# Patient Record
Sex: FEMALE | Race: WHITE | Employment: FULL TIME | URBAN - METROPOLITAN AREA
[De-identification: names, ages, dates, MRNs, and addresses within clinical notes are randomized per-mention and may not be internally consistent; named-entity substitution may affect disease eponyms.]

---

## 2018-07-02 PROBLEM — H69.93 EUSTACHIAN TUBE DYSFUNCTION, BILATERAL: Status: ACTIVE | Noted: 2018-07-02

## 2018-07-02 PROBLEM — H93.293 ABNORMAL AUDITORY PERCEPTION, BILATERAL: Status: ACTIVE | Noted: 2018-07-02

## 2018-07-02 PROBLEM — H69.83 EUSTACHIAN TUBE DYSFUNCTION, BILATERAL: Status: ACTIVE | Noted: 2018-07-02

## 2022-07-29 ENCOUNTER — OFFICE VISIT (OUTPATIENT)
Facility: LOCATION | Age: 52
End: 2022-07-29
Payer: COMMERCIAL

## 2022-07-29 DIAGNOSIS — H93.12 TINNITUS OF LEFT EAR: Primary | ICD-10-CM

## 2022-07-29 DIAGNOSIS — H93.293 ABNORMAL AUDITORY PERCEPTION OF BOTH EARS: Primary | ICD-10-CM

## 2022-07-29 PROCEDURE — 99213 OFFICE O/P EST LOW 20 MIN: CPT | Performed by: OTOLARYNGOLOGY

## 2022-07-29 RX ORDER — FLUOCINOLONE ACETONIDE 0.11 MG/ML
3 OIL AURICULAR (OTIC) 3 TIMES DAILY
Qty: 20 ML | Refills: 0 | Status: SHIPPED | OUTPATIENT
Start: 2022-07-29 | End: 2022-07-29

## 2022-07-29 RX ORDER — FLUOCINOLONE ACETONIDE 0.11 MG/ML
3 OIL AURICULAR (OTIC) 3 TIMES DAILY
Qty: 20 ML | Refills: 0 | Status: SHIPPED | OUTPATIENT
Start: 2022-07-29 | End: 2022-08-05

## 2022-07-29 NOTE — PROGRESS NOTES
Agustina Hansen is a 45year old female. Patient presents with:  Ear Problem: clicking in ear and pressure     HPI:   Follow-up 61-year-old female she came in for wax impaction which I cleaned under the operative microscope had a normal audiogram after that has been complaining of a constant clicking in her left ear since the wax removal wax was done without irrigation. No current outpatient medications on file. No past medical history on file. Social History:  Social History    Tobacco Use      Smoking status: Never Smoker      Smokeless tobacco: Never Used    Alcohol use: Not on file    Drug use: Not on file     No past surgical history on file. REVIEW OF SYSTEMS:   GENERAL HEALTH: feels well otherwise  GENERAL : denies fever, chills, sweats, weight loss, weight gain  SKIN: denies any unusual skin lesions or rashes  RESPIRATORY: denies shortness of breath with exertion  NEURO: denies headaches    EXAM:   There were no vitals taken for this visit. System Findings Details   Constitutional Normal Overall appearance - Normal.   Head/Face Normal Facial features -- Normal. Skull - Normal.   Eyes Normal Pupils equal ,round ,react to light and accomidate   Ears Normal External Ear Right: Normal, Left: Normal. Canal - Right: Normal, Left: Normal. TM - Right: Normal left: Normal   Nose Normal External Nose, Normal, Septum -normal turbinates - Right: Normal left: Normal   Mouth/Throat Normal Lips/teeth/gums - Normal. Tonsils -normal oropharynx - Normal.   Neck Exam Normal Inspection - Normal. Palpation - Normal. Parotid gland - Normal. Thyroid gland - Normal.   Lymph Detail Normal Submental. Submandibular. Anterior cervical. Posterior cervical. Supraclavicular. ASSESSMENT AND PLAN:   1. Tinnitus of left ear  Patient experience clicking sensation left ear I evaluated under the microscope there was a small crust located near to the eardrum. I removed it with some improvement.   We will start her on Derm otic eardrops for 1 week duration left ear only. If continued symptoms she will call for further recommendations      The patient indicates understanding of these issues and agrees to the plan.       Leonora Robles MD  7/29/2022  12:40 PM

## 2022-07-29 NOTE — PROGRESS NOTES
Fabio Schneider was seen for an audiometric evaluation today. Referred back to physician.     Molly Katz

## 2022-09-19 ENCOUNTER — OFFICE VISIT (OUTPATIENT)
Facility: LOCATION | Age: 52
End: 2022-09-19
Payer: COMMERCIAL

## 2022-09-19 DIAGNOSIS — H93.12 TINNITUS, LEFT: Primary | ICD-10-CM

## 2022-09-19 DIAGNOSIS — H93.12 TINNITUS OF LEFT EAR: Primary | ICD-10-CM

## 2022-09-19 PROCEDURE — 92552 PURE TONE AUDIOMETRY AIR: CPT | Performed by: AUDIOLOGIST

## 2022-09-19 PROCEDURE — 99213 OFFICE O/P EST LOW 20 MIN: CPT | Performed by: OTOLARYNGOLOGY

## 2022-09-19 PROCEDURE — 92567 TYMPANOMETRY: CPT | Performed by: AUDIOLOGIST

## 2022-09-19 NOTE — PROGRESS NOTES
Morenita Reynolds is a 45year old female. Patient presents with:  Ear Pain  Ear Problem: Tinnitus     HPI:   Patient came in a while back had wax impaction and complained of a clicking sensation in her left ear. Her wax was cleaned she was given Derm otic drops told to come back if this persisted. Left ear clicking is persisted not associated with otorrhea, otalgia, vertigo, or hearing loss. Her initial audiogram showed a slight asymmetry in the right ear low-frequency. She did not notice this hearing loss. No current outpatient medications on file. History reviewed. No pertinent past medical history. Social History:  Social History    Tobacco Use      Smoking status: Never Smoker      Smokeless tobacco: Never Used    Alcohol use: Not on file    Drug use: Not on file     History reviewed. No pertinent surgical history. REVIEW OF SYSTEMS:   GENERAL HEALTH: feels well otherwise  GENERAL : denies fever, chills, sweats, weight loss, weight gain  SKIN: denies any unusual skin lesions or rashes  RESPIRATORY: denies shortness of breath with exertion  NEURO: denies headaches    EXAM:   There were no vitals taken for this visit. System Pertinent findings Details   Constitutional  Overall appearance - Normal.   Head/Face  Facial features -- Normal. Skull - Normal.   Eyes  Pupils equal ,round ,react to light and accomidate   Ears  External Ear Right: Normal, Left: Normal. Canal - Right: Normal, Left: Normal. TM - Right: Normal left: Normal   Nose  External Nose, Normal, Septum -normal turbinates - Right: Normal left: Normal   Mouth/Throat  Lips/teeth/gums - Normal. Tonsils -normal oropharynx - Normal.   Neck Exam  Inspection - Normal. Palpation - Normal. Parotid gland - Normal. Thyroid gland -normal   Lymph Detail  Submental. Submandibular. Anterior cervical. Posterior cervical. Supraclavicular. Most recent audiogram shows completely normal hearing normal type a tympanogram  ASSESSMENT AND PLAN:   1. Tinnitus, left  Follow-up audiogram 6 to 8 months. No other recommendations at this time. Speculation sometimes clicking can be due to tensor tympani spasm or stapedius muscle spasm. The patient indicates understanding of these issues and agrees to the plan.       Flaco Dominguez MD  9/19/2022  4:35 PM

## 2022-09-19 NOTE — PROGRESS NOTES
Zonia Temple was seen for an audiometric evaluation and tympanogram today. Referred back to physician.     Jonathan Arambula MS, CCC-A

## 2024-09-18 ENCOUNTER — OFFICE VISIT (OUTPATIENT)
Facility: LOCATION | Age: 54
End: 2024-09-18
Payer: COMMERCIAL

## 2024-09-18 DIAGNOSIS — H93.293 ABNORMAL AUDITORY PERCEPTION OF BOTH EARS: Primary | ICD-10-CM

## 2024-09-18 DIAGNOSIS — H93.12 TINNITUS OF LEFT EAR: Primary | ICD-10-CM

## 2024-09-18 PROCEDURE — 92567 TYMPANOMETRY: CPT | Performed by: AUDIOLOGIST

## 2024-09-18 PROCEDURE — 92557 COMPREHENSIVE HEARING TEST: CPT | Performed by: AUDIOLOGIST

## 2024-09-18 PROCEDURE — 99214 OFFICE O/P EST MOD 30 MIN: CPT | Performed by: OTOLARYNGOLOGY

## 2024-09-18 NOTE — PROGRESS NOTES
Tracy Ignacio was seen for audiometric evaluation today.  Referred back to physician.     Molly Ashley

## 2024-09-18 NOTE — PROGRESS NOTES
Tracy Ignacio is a 54 year old female. No chief complaint on file.    HPI:   54-year-old white female with history of clicking sensation in the left ear comes and goes I saw her 2 years ago to the day she had normal hearing thought it might be some sort of muscle spasm of either the stapedius or tensor tympani muscle I suggested just follow-up audiograms.  Apparently this went away for 6 to 8 months but then came back again she is here for follow-up.  No other symptoms related to this  No current outpatient medications on file.      History reviewed. No pertinent past medical history.   Social History:  Social History     Socioeconomic History    Marital status:    Tobacco Use    Smoking status: Never    Smokeless tobacco: Never      History reviewed. No pertinent surgical history.      REVIEW OF SYSTEMS:   GENERAL HEALTH: feels well otherwise  GENERAL : denies fever, chills, sweats, weight loss, weight gain  SKIN: denies any unusual skin lesions or rashes  RESPIRATORY: denies shortness of breath with exertion  NEURO: denies headaches    EXAM:   There were no vitals taken for this visit.    System Pertinent findings Details   Constitutional  Overall appearance - Normal.   Head/Face  Facial features -- Normal. Skull - Normal.   Eyes  Pupils equal ,round ,react to light and accomidate   Ears  External Ear Right: Normal, Left: Normal. Canal - Right: Normal, Left: Normal. TM - Right: Normal left: Normal   Nose  External Nose, Normal, Septum -midline,Nasal Vault, clear. Turbinates - Right: Normal left: Normal   Mouth/Throat  Lips/teeth/gums - Normal. Tonsils -1+ oropharynx - Normal.   Neck Exam  Inspection - Normal. Palpation - Normal. Parotid gland - Normal. Thyroid gland -normal   Lymph Detail  Submental. Submandibular. Anterior cervical. Posterior cervical. Supraclavicular.   Audiogram today as compared to the last time we did it 2 years ago she has a perfect audiogram all at 0 dB    ASSESSMENT AND PLAN:   1.  Tinnitus of left ear  I recommended she could see Rolan Anderson at [facility] possibly a procedure to cut the stapedius muscle maybe would help.  Otherwise just periodic audiograms      The patient indicates understanding of these issues and agrees to the plan.      Haseeb Foley MD  9/18/2024  4:39 PM